# Patient Record
Sex: FEMALE | Race: ASIAN | NOT HISPANIC OR LATINO | URBAN - METROPOLITAN AREA
[De-identification: names, ages, dates, MRNs, and addresses within clinical notes are randomized per-mention and may not be internally consistent; named-entity substitution may affect disease eponyms.]

---

## 2021-10-12 ENCOUNTER — OUTPATIENT (OUTPATIENT)
Dept: OUTPATIENT SERVICES | Facility: HOSPITAL | Age: 26
LOS: 1 days | End: 2021-10-12
Payer: MEDICAID

## 2021-10-12 VITALS
SYSTOLIC BLOOD PRESSURE: 124 MMHG | HEIGHT: 64 IN | HEART RATE: 72 BPM | OXYGEN SATURATION: 100 % | RESPIRATION RATE: 16 BRPM | DIASTOLIC BLOOD PRESSURE: 84 MMHG | TEMPERATURE: 98 F | WEIGHT: 119.93 LBS

## 2021-10-12 DIAGNOSIS — D25.9 LEIOMYOMA OF UTERUS, UNSPECIFIED: ICD-10-CM

## 2021-10-12 DIAGNOSIS — Z01.818 ENCOUNTER FOR OTHER PREPROCEDURAL EXAMINATION: ICD-10-CM

## 2021-10-12 DIAGNOSIS — Z29.9 ENCOUNTER FOR PROPHYLACTIC MEASURES, UNSPECIFIED: ICD-10-CM

## 2021-10-12 DIAGNOSIS — D64.9 ANEMIA, UNSPECIFIED: ICD-10-CM

## 2021-10-12 LAB
ANION GAP SERPL CALC-SCNC: 6 MMOL/L — SIGNIFICANT CHANGE UP (ref 5–17)
APPEARANCE UR: CLEAR — SIGNIFICANT CHANGE UP
APTT BLD: 31.2 SEC — SIGNIFICANT CHANGE UP (ref 27.5–35.5)
BACTERIA # UR AUTO: ABNORMAL /HPF
BILIRUB UR-MCNC: NEGATIVE — SIGNIFICANT CHANGE UP
BLD GP AB SCN SERPL QL: SIGNIFICANT CHANGE UP
BUN SERPL-MCNC: 10 MG/DL — SIGNIFICANT CHANGE UP (ref 7–18)
CALCIUM SERPL-MCNC: 9.3 MG/DL — SIGNIFICANT CHANGE UP (ref 8.4–10.5)
CHLORIDE SERPL-SCNC: 107 MMOL/L — SIGNIFICANT CHANGE UP (ref 96–108)
CO2 SERPL-SCNC: 27 MMOL/L — SIGNIFICANT CHANGE UP (ref 22–31)
COLOR SPEC: YELLOW — SIGNIFICANT CHANGE UP
CREAT SERPL-MCNC: 0.51 MG/DL — SIGNIFICANT CHANGE UP (ref 0.5–1.3)
DIFF PNL FLD: ABNORMAL
EPI CELLS # UR: ABNORMAL /HPF
GLUCOSE SERPL-MCNC: 76 MG/DL — SIGNIFICANT CHANGE UP (ref 70–99)
GLUCOSE UR QL: NEGATIVE — SIGNIFICANT CHANGE UP
HCT VFR BLD CALC: 35.6 % — SIGNIFICANT CHANGE UP (ref 34.5–45)
HGB BLD-MCNC: 11.3 G/DL — LOW (ref 11.5–15.5)
INR BLD: 0.99 RATIO — SIGNIFICANT CHANGE UP (ref 0.88–1.16)
KETONES UR-MCNC: NEGATIVE — SIGNIFICANT CHANGE UP
LEUKOCYTE ESTERASE UR-ACNC: NEGATIVE — SIGNIFICANT CHANGE UP
MCHC RBC-ENTMCNC: 26 PG — LOW (ref 27–34)
MCHC RBC-ENTMCNC: 31.7 GM/DL — LOW (ref 32–36)
MCV RBC AUTO: 82 FL — SIGNIFICANT CHANGE UP (ref 80–100)
NITRITE UR-MCNC: NEGATIVE — SIGNIFICANT CHANGE UP
NRBC # BLD: 0 /100 WBCS — SIGNIFICANT CHANGE UP (ref 0–0)
PH UR: 5 — SIGNIFICANT CHANGE UP (ref 5–8)
PLATELET # BLD AUTO: 333 K/UL — SIGNIFICANT CHANGE UP (ref 150–400)
POTASSIUM SERPL-MCNC: 3.9 MMOL/L — SIGNIFICANT CHANGE UP (ref 3.5–5.3)
POTASSIUM SERPL-SCNC: 3.9 MMOL/L — SIGNIFICANT CHANGE UP (ref 3.5–5.3)
PROT UR-MCNC: NEGATIVE — SIGNIFICANT CHANGE UP
PROTHROM AB SERPL-ACNC: 11.8 SEC — SIGNIFICANT CHANGE UP (ref 10.6–13.6)
RBC # BLD: 4.34 M/UL — SIGNIFICANT CHANGE UP (ref 3.8–5.2)
RBC # FLD: 16.8 % — HIGH (ref 10.3–14.5)
RBC CASTS # UR COMP ASSIST: ABNORMAL /HPF (ref 0–2)
SODIUM SERPL-SCNC: 140 MMOL/L — SIGNIFICANT CHANGE UP (ref 135–145)
SP GR SPEC: 1.01 — SIGNIFICANT CHANGE UP (ref 1.01–1.02)
UROBILINOGEN FLD QL: NEGATIVE — SIGNIFICANT CHANGE UP
WBC # BLD: 3.91 K/UL — SIGNIFICANT CHANGE UP (ref 3.8–10.5)
WBC # FLD AUTO: 3.91 K/UL — SIGNIFICANT CHANGE UP (ref 3.8–10.5)
WBC UR QL: SIGNIFICANT CHANGE UP /HPF (ref 0–5)

## 2021-10-12 PROCEDURE — 93005 ELECTROCARDIOGRAM TRACING: CPT

## 2021-10-12 PROCEDURE — 93010 ELECTROCARDIOGRAM REPORT: CPT

## 2021-10-12 PROCEDURE — G0463: CPT

## 2021-10-12 NOTE — H&P PST ADULT - ATTENDING COMMENTS
plan for ex lap myomectomy and ovarian cystectomy. risks and benefits disc with patient, including bleeding, infection, injury to surrounding organs, possible hysterectomy.  All questions answered.  She wishes to proceed

## 2021-10-12 NOTE — H&P PST ADULT - PROBLEM SELECTOR PLAN 2
Preoperative instructions discussed and given to patient.   Discussed pre-procedural skin preparation using chlorhexidine gluconate 4% solution the morning of surgery prior to coming to hospital.   NPO after midnight.   Instructed to stop aspirin and over the counter medication including vitamins and herbal medications one week prior to surgery unless otherwise instructed by your doctor or anesthesia.   Verbal and written instructions given to patient.   Patient verbalized understanding of instructions and is in agreement with the plan of care.

## 2021-10-12 NOTE — H&P PST ADULT - HISTORY OF PRESENT ILLNESS
july 2021 bleeding 5 hemoglobin , 3 prbc , gyn , fibroids , sx  26 years old female presented to [facility] for evaluation before sx, pt was diagnosed with leiomyoma of uterus, unspecified and is scheduled for exploratory laparoscopic myomectomy on 10/20/2021. Patient had 3 units of PRBC transfused in 07/26/2021 for bleeding in [facility] for hemoglobin 5

## 2021-10-12 NOTE — H&P PST ADULT - NSANTHOSAYNRD_GEN_A_CORE
No. ALTAF screening performed.  STOP BANG Legend: 0-2 = LOW Risk; 3-4 = INTERMEDIATE Risk; 5-8 = HIGH Risk

## 2021-10-12 NOTE — H&P PST ADULT - PROBLEM SELECTOR PLAN 3
As per Dr. Montalvo, Anesthesia , pt needs to have blood ready in case of need of transfusion during surgery. Type and screen sent, second specimen to be sent in am of surgery from [facility].   GYN PA , kindly please to follow up on PRBC ordering for surgery ( 2 units please verify with GYN doctor and Anesthesia). Thank you

## 2021-10-17 ENCOUNTER — APPOINTMENT (OUTPATIENT)
Dept: DISASTER EMERGENCY | Facility: CLINIC | Age: 26
End: 2021-10-17

## 2021-10-17 DIAGNOSIS — Z01.818 ENCOUNTER FOR OTHER PREPROCEDURAL EXAMINATION: ICD-10-CM

## 2021-10-17 PROBLEM — Z00.00 ENCOUNTER FOR PREVENTIVE HEALTH EXAMINATION: Status: ACTIVE | Noted: 2021-10-17

## 2021-10-18 LAB — SARS-COV-2 N GENE NPH QL NAA+PROBE: NOT DETECTED

## 2021-10-19 ENCOUNTER — TRANSCRIPTION ENCOUNTER (OUTPATIENT)
Age: 26
End: 2021-10-19

## 2021-10-20 ENCOUNTER — INPATIENT (INPATIENT)
Facility: HOSPITAL | Age: 26
LOS: 1 days | Discharge: HOME | DRG: 742 | End: 2021-10-22
Attending: OBSTETRICS & GYNECOLOGY | Admitting: OBSTETRICS & GYNECOLOGY
Payer: MEDICAID

## 2021-10-20 ENCOUNTER — RESULT REVIEW (OUTPATIENT)
Age: 26
End: 2021-10-20

## 2021-10-20 VITALS
SYSTOLIC BLOOD PRESSURE: 122 MMHG | TEMPERATURE: 99 F | HEART RATE: 78 BPM | HEIGHT: 64 IN | DIASTOLIC BLOOD PRESSURE: 74 MMHG | OXYGEN SATURATION: 99 % | RESPIRATION RATE: 16 BRPM | WEIGHT: 119.93 LBS

## 2021-10-20 DIAGNOSIS — D25.9 LEIOMYOMA OF UTERUS, UNSPECIFIED: ICD-10-CM

## 2021-10-20 LAB
BLD GP AB SCN SERPL QL: SIGNIFICANT CHANGE UP
HCG UR QL: NEGATIVE — SIGNIFICANT CHANGE UP

## 2021-10-20 PROCEDURE — 88305 TISSUE EXAM BY PATHOLOGIST: CPT | Mod: 26

## 2021-10-20 RX ORDER — OXYCODONE HYDROCHLORIDE 5 MG/1
10 TABLET ORAL EVERY 4 HOURS
Refills: 0 | Status: DISCONTINUED | OUTPATIENT
Start: 2021-10-20 | End: 2021-10-22

## 2021-10-20 RX ORDER — SODIUM CHLORIDE 9 MG/ML
3 INJECTION INTRAMUSCULAR; INTRAVENOUS; SUBCUTANEOUS EVERY 8 HOURS
Refills: 0 | Status: DISCONTINUED | OUTPATIENT
Start: 2021-10-20 | End: 2021-10-20

## 2021-10-20 RX ORDER — HYDROMORPHONE HYDROCHLORIDE 2 MG/ML
0.5 INJECTION INTRAMUSCULAR; INTRAVENOUS; SUBCUTANEOUS
Refills: 0 | Status: DISCONTINUED | OUTPATIENT
Start: 2021-10-20 | End: 2021-10-20

## 2021-10-20 RX ORDER — PANTOPRAZOLE SODIUM 20 MG/1
40 TABLET, DELAYED RELEASE ORAL DAILY
Refills: 0 | Status: DISCONTINUED | OUTPATIENT
Start: 2021-10-20 | End: 2021-10-22

## 2021-10-20 RX ORDER — SENNA PLUS 8.6 MG/1
1 TABLET ORAL AT BEDTIME
Refills: 0 | Status: DISCONTINUED | OUTPATIENT
Start: 2021-10-20 | End: 2021-10-22

## 2021-10-20 RX ORDER — INDOMETHACIN 50 MG
50 CAPSULE ORAL ONCE
Refills: 0 | Status: COMPLETED | OUTPATIENT
Start: 2021-10-20 | End: 2021-10-20

## 2021-10-20 RX ORDER — SIMETHICONE 80 MG/1
80 TABLET, CHEWABLE ORAL EVERY 6 HOURS
Refills: 0 | Status: DISCONTINUED | OUTPATIENT
Start: 2021-10-20 | End: 2021-10-22

## 2021-10-20 RX ORDER — HYDROMORPHONE HYDROCHLORIDE 2 MG/ML
1 INJECTION INTRAMUSCULAR; INTRAVENOUS; SUBCUTANEOUS
Refills: 0 | Status: DISCONTINUED | OUTPATIENT
Start: 2021-10-20 | End: 2021-10-20

## 2021-10-20 RX ORDER — ACETAMINOPHEN 500 MG
1000 TABLET ORAL EVERY 6 HOURS
Refills: 0 | Status: DISCONTINUED | OUTPATIENT
Start: 2021-10-20 | End: 2021-10-22

## 2021-10-20 RX ORDER — KETOROLAC TROMETHAMINE 30 MG/ML
30 SYRINGE (ML) INJECTION EVERY 8 HOURS
Refills: 0 | Status: DISCONTINUED | OUTPATIENT
Start: 2021-10-20 | End: 2021-10-20

## 2021-10-20 RX ORDER — ONDANSETRON 8 MG/1
8 TABLET, FILM COATED ORAL EVERY 8 HOURS
Refills: 0 | Status: COMPLETED | OUTPATIENT
Start: 2021-10-20 | End: 2021-10-21

## 2021-10-20 RX ORDER — INDOMETHACIN 50 MG
25 CAPSULE ORAL EVERY 6 HOURS
Refills: 0 | Status: DISCONTINUED | OUTPATIENT
Start: 2021-10-20 | End: 2021-10-22

## 2021-10-20 RX ORDER — ENOXAPARIN SODIUM 100 MG/ML
40 INJECTION SUBCUTANEOUS EVERY 24 HOURS
Refills: 0 | Status: DISCONTINUED | OUTPATIENT
Start: 2021-10-20 | End: 2021-10-22

## 2021-10-20 RX ADMIN — HYDROMORPHONE HYDROCHLORIDE 1 MILLIGRAM(S): 2 INJECTION INTRAMUSCULAR; INTRAVENOUS; SUBCUTANEOUS at 12:06

## 2021-10-20 RX ADMIN — SODIUM CHLORIDE 3 MILLILITER(S): 9 INJECTION INTRAMUSCULAR; INTRAVENOUS; SUBCUTANEOUS at 06:50

## 2021-10-20 RX ADMIN — Medication 50 MILLIGRAM(S): at 21:49

## 2021-10-20 RX ADMIN — ENOXAPARIN SODIUM 40 MILLIGRAM(S): 100 INJECTION SUBCUTANEOUS at 22:03

## 2021-10-20 RX ADMIN — OXYCODONE HYDROCHLORIDE 10 MILLIGRAM(S): 5 TABLET ORAL at 16:07

## 2021-10-20 RX ADMIN — OXYCODONE HYDROCHLORIDE 10 MILLIGRAM(S): 5 TABLET ORAL at 17:07

## 2021-10-20 RX ADMIN — HYDROMORPHONE HYDROCHLORIDE 1 MILLIGRAM(S): 2 INJECTION INTRAMUSCULAR; INTRAVENOUS; SUBCUTANEOUS at 11:05

## 2021-10-20 RX ADMIN — Medication 50 MILLIGRAM(S): at 22:41

## 2021-10-20 RX ADMIN — Medication 1000 MILLIGRAM(S): at 17:09

## 2021-10-20 NOTE — BRIEF OPERATIVE NOTE - NSICDXBRIEFPREOP_GEN_ALL_CORE_FT
PRE-OP DIAGNOSIS:  Uterine fibroid 20-Oct-2021 10:17:26  Aziza Chi  Paratubal cyst 20-Oct-2021 10:18:04  Aziza Chi

## 2021-10-20 NOTE — BRIEF OPERATIVE NOTE - NSICDXBRIEFPOSTOP_GEN_ALL_CORE_FT
POST-OP DIAGNOSIS:  Uterine fibroid 20-Oct-2021 10:18:42  Aziza Chi  Paratubal cyst 20-Oct-2021 10:18:55  Aziza Chi

## 2021-10-20 NOTE — CHART NOTE - NSCHARTNOTEFT_GEN_A_CORE
GYN PA POST OP NOTE    Pt seen at bedside offers no complaints. Denies n/v, CP; SOB; palpitations; or dizziness    T(C): 36.7 (10-20-21 @ 12:20), Max: 37.3 (10-20-21 @ 06:44)  HR: 72 (10-20-21 @ 12:20) (59 - 78)  BP: 100/62 (10-20-21 @ 12:20) (100/62 - 122/74)  RR: 16 (10-20-21 @ 12:20) (16 - 22)  SpO2: 97% (10-20-21 @ 12:20) (97% - 100%)    abd: soft/nt, incision clean, dry, intact   pelvic: minimal lochia  ext: venodynes in place; no calf pain    A/P: POD #0 s/p abdominal myomectomy, L ovarian cystectomy   cont close monitor   cont post op care  lovenox/venodyne boots for DVT ppx  pain mgmt  d/w Dr. Chi GYN PA POST OP NOTE    Pt seen at bedside offers no complaints. Denies n/v, CP; SOB; palpitations; or dizziness    T(C): 36.7 (10-20-21 @ 12:20), Max: 37.3 (10-20-21 @ 06:44)  HR: 72 (10-20-21 @ 12:20) (59 - 78)  BP: 100/62 (10-20-21 @ 12:20) (100/62 - 122/74)  RR: 16 (10-20-21 @ 12:20) (16 - 22)  SpO2: 97% (10-20-21 @ 12:20) (97% - 100%)    abd: soft/nt, incision clean, dry, intact   pelvic: pad saturated, golf ball size clot expressed, pad changed, no active bleeding    ext: venodynes in place; no calf pain    A/P: POD #0 s/p abdominal myomectomy, L ovarian cystectomy   cont close monitor   cont post op care  lovenox/venodyne boots for DVT ppx  pain mgmt  d/w Dr. Chi

## 2021-10-20 NOTE — BRIEF OPERATIVE NOTE - NSICDXBRIEFPROCEDURE_GEN_ALL_CORE_FT
PROCEDURES:  Abdominal myomectomy 20-Oct-2021 10:15:20  Aziza Chi  Removal of ovarian cyst 20-Oct-2021 10:17:15  Aziza Chi

## 2021-10-21 LAB
ANION GAP SERPL CALC-SCNC: 4 MMOL/L — LOW (ref 5–17)
BASOPHILS # BLD AUTO: 0.01 K/UL — SIGNIFICANT CHANGE UP (ref 0–0.2)
BASOPHILS NFR BLD AUTO: 0.2 % — SIGNIFICANT CHANGE UP (ref 0–2)
BUN SERPL-MCNC: 8 MG/DL — SIGNIFICANT CHANGE UP (ref 7–18)
CALCIUM SERPL-MCNC: 8.2 MG/DL — LOW (ref 8.4–10.5)
CHLORIDE SERPL-SCNC: 108 MMOL/L — SIGNIFICANT CHANGE UP (ref 96–108)
CO2 SERPL-SCNC: 28 MMOL/L — SIGNIFICANT CHANGE UP (ref 22–31)
COVID-19 SPIKE DOMAIN AB INTERP: POSITIVE
COVID-19 SPIKE DOMAIN ANTIBODY RESULT: >250 U/ML — HIGH
CREAT SERPL-MCNC: 0.5 MG/DL — SIGNIFICANT CHANGE UP (ref 0.5–1.3)
EOSINOPHIL # BLD AUTO: 0.12 K/UL — SIGNIFICANT CHANGE UP (ref 0–0.5)
EOSINOPHIL NFR BLD AUTO: 2 % — SIGNIFICANT CHANGE UP (ref 0–6)
GLUCOSE SERPL-MCNC: 121 MG/DL — HIGH (ref 70–99)
HCT VFR BLD CALC: 24.8 % — LOW (ref 34.5–45)
HGB BLD-MCNC: 8 G/DL — LOW (ref 11.5–15.5)
IMM GRANULOCYTES NFR BLD AUTO: 0.3 % — SIGNIFICANT CHANGE UP (ref 0–1.5)
LYMPHOCYTES # BLD AUTO: 1.46 K/UL — SIGNIFICANT CHANGE UP (ref 1–3.3)
LYMPHOCYTES # BLD AUTO: 24.7 % — SIGNIFICANT CHANGE UP (ref 13–44)
MCHC RBC-ENTMCNC: 26.3 PG — LOW (ref 27–34)
MCHC RBC-ENTMCNC: 32.3 GM/DL — SIGNIFICANT CHANGE UP (ref 32–36)
MCV RBC AUTO: 81.6 FL — SIGNIFICANT CHANGE UP (ref 80–100)
MONOCYTES # BLD AUTO: 0.73 K/UL — SIGNIFICANT CHANGE UP (ref 0–0.9)
MONOCYTES NFR BLD AUTO: 12.4 % — SIGNIFICANT CHANGE UP (ref 2–14)
NEUTROPHILS # BLD AUTO: 3.57 K/UL — SIGNIFICANT CHANGE UP (ref 1.8–7.4)
NEUTROPHILS NFR BLD AUTO: 60.4 % — SIGNIFICANT CHANGE UP (ref 43–77)
NRBC # BLD: 0 /100 WBCS — SIGNIFICANT CHANGE UP (ref 0–0)
PLATELET # BLD AUTO: 242 K/UL — SIGNIFICANT CHANGE UP (ref 150–400)
POTASSIUM SERPL-MCNC: 4.2 MMOL/L — SIGNIFICANT CHANGE UP (ref 3.5–5.3)
POTASSIUM SERPL-SCNC: 4.2 MMOL/L — SIGNIFICANT CHANGE UP (ref 3.5–5.3)
RBC # BLD: 3.04 M/UL — LOW (ref 3.8–5.2)
RBC # FLD: 15.8 % — HIGH (ref 10.3–14.5)
SARS-COV-2 IGG+IGM SERPL QL IA: >250 U/ML — HIGH
SARS-COV-2 IGG+IGM SERPL QL IA: POSITIVE
SODIUM SERPL-SCNC: 140 MMOL/L — SIGNIFICANT CHANGE UP (ref 135–145)
WBC # BLD: 5.91 K/UL — SIGNIFICANT CHANGE UP (ref 3.8–10.5)
WBC # FLD AUTO: 5.91 K/UL — SIGNIFICANT CHANGE UP (ref 3.8–10.5)

## 2021-10-21 RX ORDER — PANTOPRAZOLE SODIUM 20 MG/1
1 TABLET, DELAYED RELEASE ORAL
Qty: 30 | Refills: 0
Start: 2021-10-21 | End: 2021-11-19

## 2021-10-21 RX ORDER — FERROUS SULFATE 325(65) MG
1 TABLET ORAL
Qty: 90 | Refills: 0
Start: 2021-10-21 | End: 2021-11-19

## 2021-10-21 RX ORDER — ACETAMINOPHEN 500 MG
1000 TABLET ORAL ONCE
Refills: 0 | Status: COMPLETED | OUTPATIENT
Start: 2021-10-21 | End: 2021-10-21

## 2021-10-21 RX ORDER — ASCORBIC ACID 60 MG
500 TABLET,CHEWABLE ORAL DAILY
Refills: 0 | Status: DISCONTINUED | OUTPATIENT
Start: 2021-10-21 | End: 2021-10-22

## 2021-10-21 RX ORDER — FERROUS SULFATE 325(65) MG
325 TABLET ORAL
Refills: 0 | Status: DISCONTINUED | OUTPATIENT
Start: 2021-10-21 | End: 2021-10-22

## 2021-10-21 RX ORDER — IBUPROFEN 200 MG
1 TABLET ORAL
Qty: 20 | Refills: 0
Start: 2021-10-21 | End: 2021-10-25

## 2021-10-21 RX ORDER — SENNOSIDES/DOCUSATE SODIUM 8.6MG-50MG
2 TABLET ORAL
Qty: 60 | Refills: 0
Start: 2021-10-21 | End: 2021-11-19

## 2021-10-21 RX ORDER — METOCLOPRAMIDE HCL 10 MG
10 TABLET ORAL ONCE
Refills: 0 | Status: COMPLETED | OUTPATIENT
Start: 2021-10-21 | End: 2021-10-21

## 2021-10-21 RX ORDER — ASCORBIC ACID 60 MG
1 TABLET,CHEWABLE ORAL
Qty: 0 | Refills: 0 | DISCHARGE
Start: 2021-10-21

## 2021-10-21 RX ORDER — ACETAMINOPHEN 500 MG
2 TABLET ORAL
Qty: 40 | Refills: 1
Start: 2021-10-21 | End: 2021-10-30

## 2021-10-21 RX ORDER — SIMETHICONE 80 MG/1
1 TABLET, CHEWABLE ORAL
Qty: 30 | Refills: 0
Start: 2021-10-21 | End: 2021-10-25

## 2021-10-21 RX ORDER — NORETHINDRONE AND ETHINYL ESTRADIOL 0.4-0.035
1 KIT ORAL
Qty: 0 | Refills: 0 | DISCHARGE

## 2021-10-21 RX ORDER — ONDANSETRON 8 MG/1
4 TABLET, FILM COATED ORAL ONCE
Refills: 0 | Status: DISCONTINUED | OUTPATIENT
Start: 2021-10-21 | End: 2021-10-22

## 2021-10-21 RX ADMIN — Medication 325 MILLIGRAM(S): at 18:24

## 2021-10-21 RX ADMIN — Medication 1000 MILLIGRAM(S): at 06:28

## 2021-10-21 RX ADMIN — Medication 1000 MILLIGRAM(S): at 23:40

## 2021-10-21 RX ADMIN — Medication 1000 MILLIGRAM(S): at 00:53

## 2021-10-21 RX ADMIN — Medication 1000 MILLIGRAM(S): at 18:57

## 2021-10-21 RX ADMIN — Medication 25 MILLIGRAM(S): at 12:33

## 2021-10-21 RX ADMIN — Medication 25 MILLIGRAM(S): at 10:51

## 2021-10-21 RX ADMIN — Medication 25 MILLIGRAM(S): at 16:58

## 2021-10-21 RX ADMIN — Medication 500 MILLIGRAM(S): at 11:04

## 2021-10-21 RX ADMIN — Medication 25 MILLIGRAM(S): at 02:25

## 2021-10-21 RX ADMIN — Medication 1000 MILLIGRAM(S): at 11:05

## 2021-10-21 RX ADMIN — Medication 400 MILLIGRAM(S): at 10:51

## 2021-10-21 RX ADMIN — ONDANSETRON 8 MILLIGRAM(S): 8 TABLET, FILM COATED ORAL at 10:57

## 2021-10-21 RX ADMIN — PANTOPRAZOLE SODIUM 40 MILLIGRAM(S): 20 TABLET, DELAYED RELEASE ORAL at 11:04

## 2021-10-21 RX ADMIN — ENOXAPARIN SODIUM 40 MILLIGRAM(S): 100 INJECTION SUBCUTANEOUS at 21:38

## 2021-10-21 RX ADMIN — Medication 1000 MILLIGRAM(S): at 00:10

## 2021-10-21 RX ADMIN — Medication 10 MILLIGRAM(S): at 14:46

## 2021-10-21 RX ADMIN — Medication 25 MILLIGRAM(S): at 16:27

## 2021-10-21 RX ADMIN — Medication 25 MILLIGRAM(S): at 05:53

## 2021-10-21 NOTE — PROGRESS NOTE ADULT - ASSESSMENT
pod#1 s/p abd myomectomy  -pain management per dr nagy; iv tylenol  cont indocin for post myomectomy   vs routine  anti emetic ordered  oob  incentive spirometer   iron/vitamin c for anemia

## 2021-10-22 ENCOUNTER — TRANSCRIPTION ENCOUNTER (OUTPATIENT)
Age: 26
End: 2021-10-22

## 2021-10-22 VITALS
RESPIRATION RATE: 18 BRPM | HEART RATE: 84 BPM | TEMPERATURE: 98 F | OXYGEN SATURATION: 99 % | SYSTOLIC BLOOD PRESSURE: 111 MMHG | DIASTOLIC BLOOD PRESSURE: 56 MMHG

## 2021-10-22 PROCEDURE — 36415 COLL VENOUS BLD VENIPUNCTURE: CPT

## 2021-10-22 PROCEDURE — C1765: CPT

## 2021-10-22 PROCEDURE — 86769 SARS-COV-2 COVID-19 ANTIBODY: CPT

## 2021-10-22 PROCEDURE — 80048 BASIC METABOLIC PNL TOTAL CA: CPT

## 2021-10-22 PROCEDURE — 86850 RBC ANTIBODY SCREEN: CPT

## 2021-10-22 PROCEDURE — 81025 URINE PREGNANCY TEST: CPT

## 2021-10-22 PROCEDURE — 86900 BLOOD TYPING SEROLOGIC ABO: CPT

## 2021-10-22 PROCEDURE — 88305 TISSUE EXAM BY PATHOLOGIST: CPT

## 2021-10-22 PROCEDURE — C1889: CPT

## 2021-10-22 PROCEDURE — 85025 COMPLETE CBC W/AUTO DIFF WBC: CPT

## 2021-10-22 PROCEDURE — 86901 BLOOD TYPING SEROLOGIC RH(D): CPT

## 2021-10-22 RX ADMIN — SIMETHICONE 80 MILLIGRAM(S): 80 TABLET, CHEWABLE ORAL at 10:50

## 2021-10-22 RX ADMIN — PANTOPRAZOLE SODIUM 40 MILLIGRAM(S): 20 TABLET, DELAYED RELEASE ORAL at 10:46

## 2021-10-22 RX ADMIN — Medication 1000 MILLIGRAM(S): at 03:03

## 2021-10-22 RX ADMIN — SENNA PLUS 1 TABLET(S): 8.6 TABLET ORAL at 10:50

## 2021-10-22 RX ADMIN — Medication 1000 MILLIGRAM(S): at 06:20

## 2021-10-22 RX ADMIN — Medication 1000 MILLIGRAM(S): at 05:08

## 2021-10-22 RX ADMIN — Medication 325 MILLIGRAM(S): at 05:07

## 2021-10-22 RX ADMIN — Medication 500 MILLIGRAM(S): at 10:46

## 2021-10-22 NOTE — PROGRESS NOTE ADULT - ASSESSMENT
post op day 2  abd myomectomy  acute blood loss anemia vss asymptomatic erx iron/vitamin c  -dc home today per dr nagy  wound check/post op check in 2weeks  -dc instructions verbalized   -pt verbalizes understanding

## 2021-10-22 NOTE — DISCHARGE NOTE PROVIDER - CARE PROVIDER_API CALL
Aziza Chi)  Obstetrics and Gynecology  [address] [place] [id number]  Phone: [phone]  Fax: [phone]  Established Patient  Follow Up Time: 2 weeks

## 2021-10-22 NOTE — PROGRESS NOTE ADULT - SUBJECTIVE AND OBJECTIVE BOX
pod#1 s/p abd myomectomy  pt doing well  afebrile  c/o incisional pain  c/o mild nausea this morning  denies vomiting  due to void calhoun removed almost 8am today  has not ambulated yet    Vital Signs Last 24 Hrs  T(C): 36.8 (21 Oct 2021 06:08), Max: 37.3 (20 Oct 2021 21:06)  T(F): 98.2 (21 Oct 2021 06:08), Max: 99.1 (20 Oct 2021 21:06)  HR: 76 (21 Oct 2021 06:08) (65 - 96)  BP: 93/57 (21 Oct 2021 06:08) (93/57 - 118/69)  BP(mean): 71 (20 Oct 2021 12:00) (68 - 78)  RR: 16 (21 Oct 2021 06:08) (16 - 22)  SpO2: 99% (21 Oct 2021 06:08) (97% - 100%)    lungs b/l cta  heart rrr  abd +BS soft ND no guarding no rebound  inc site well healing no erythema/edema  pad: no active vag bleeding  extrem no edema b/l                          8.0    5.91  )-----------( 242      ( 21 Oct 2021 06:31 )             24.8   10-21    140  |  108  |  8   ----------------------------<  121<H>  4.2   |  28  |  0.50    Ca    8.2<L>      21 Oct 2021 06:31    
post op day 2  abd myomectomy  pt doing well  +flatus  void w/o difficulty  denies cp/sob/ palpitations  pt is feeling better today post operative than yesterday   offers no acute complaints    Vital Signs Last 24 Hrs  T(C): 36.3 (22 Oct 2021 05:35), Max: 36.8 (21 Oct 2021 12:30)  T(F): 97.4 (22 Oct 2021 05:35), Max: 98.3 (21 Oct 2021 12:30)  HR: 82 (22 Oct 2021 05:35) (82 - 86)  BP: 112/62 (22 Oct 2021 05:35) (91/65 - 112/62)  BP(mean): --  RR: 18 (22 Oct 2021 05:35) (16 - 18)  SpO2: 100% (22 Oct 2021 05:35) (99% - 100%)    lungs b/l cta  heart rrr  abd +Bs soft ND no guarding no rebound  inc site c/d/intact no erythema/edema  no active vag bleeding    no new labs

## 2021-10-22 NOTE — DISCHARGE NOTE PROVIDER - NSDCCPCAREPLAN_GEN_ALL_CORE_FT
PRINCIPAL DISCHARGE DIAGNOSIS  Diagnosis: Leiomyoma of uterus, unspecified  Assessment and Plan of Treatment: myomectomy done      SECONDARY DISCHARGE DIAGNOSES  Diagnosis: Anemia  Assessment and Plan of Treatment: iron/vitamin c supplement     PRINCIPAL DISCHARGE DIAGNOSIS  Diagnosis: Leiomyoma of uterus, unspecified  Assessment and Plan of Treatment: myomectomy done      SECONDARY DISCHARGE DIAGNOSES  Diagnosis: Anemia  Assessment and Plan of Treatment: iron/vitamin c supplement; green leafy vegetable for iron

## 2021-10-22 NOTE — DISCHARGE NOTE NURSING/CASE MANAGEMENT/SOCIAL WORK - PATIENT PORTAL LINK FT
You can access the FollowMyHealth Patient Portal offered by [facility] by registering at the following website: [email or web address]. By joining [facility]’s FollowMyHealth portal, you will also be able to view your health information using other applications (apps) compatible with our system.

## 2021-10-22 NOTE — DISCHARGE NOTE PROVIDER - EXTENDED VTE YES NO FOR MLM ENOXAPARIN
The patient sent a request through My [facility] asking for DM/Microalbumin and Shingles.  Please call the patient when this can be done   ,

## 2021-10-22 NOTE — DISCHARGE NOTE PROVIDER - NSDCCPTREATMENT_GEN_ALL_CORE_FT
PRINCIPAL PROCEDURE  Procedure: Abdominal myomectomy  Findings and Treatment: follow up in 2weeks post op

## 2021-10-22 NOTE — DISCHARGE NOTE PROVIDER - NSDCMRMEDTOKEN_GEN_ALL_CORE_FT
acetaminophen 500 mg oral capsule: 2 cap(s) orally every 6 hours, As Needed   ascorbic acid 500 mg oral tablet: 1 tab(s) orally once a day  ferrous sulfate 325 mg (65 mg elemental iron) oral tablet: 1 tab(s) orally 3 times a day   ibuprofen 600 mg oral tablet: 1 tab(s) orally every 6 hours, As needed, Mild pain or headache  pantoprazole 40 mg oral delayed release tablet: 1 tab(s) orally once a day  Betsy-Colace 50 mg-8.6 mg oral tablet: 2 tab(s) orally once a day (at bedtime)   simethicone 80 mg oral tablet, chewable: 1 tab(s) orally every 4 hours, As needed, Gas

## 2021-10-25 LAB — SURGICAL PATHOLOGY STUDY: SIGNIFICANT CHANGE UP

## 2022-01-03 PROBLEM — D64.9 ANEMIA, UNSPECIFIED: Chronic | Status: ACTIVE | Noted: 2021-10-12

## 2022-01-13 ENCOUNTER — APPOINTMENT (OUTPATIENT)
Dept: GASTROENTEROLOGY | Facility: CLINIC | Age: 27
End: 2022-01-13
Payer: MEDICAID

## 2022-01-13 ENCOUNTER — APPOINTMENT (OUTPATIENT)
Dept: SURGERY | Facility: CLINIC | Age: 27
End: 2022-01-13
Payer: MEDICAID

## 2022-01-13 VITALS — TEMPERATURE: 97 F

## 2022-01-13 VITALS
TEMPERATURE: 97.7 F | HEART RATE: 75 BPM | OXYGEN SATURATION: 97 % | DIASTOLIC BLOOD PRESSURE: 71 MMHG | BODY MASS INDEX: 19.29 KG/M2 | HEIGHT: 64 IN | SYSTOLIC BLOOD PRESSURE: 113 MMHG | WEIGHT: 113 LBS

## 2022-01-13 VITALS
HEIGHT: 64 IN | HEART RATE: 86 BPM | OXYGEN SATURATION: 98 % | SYSTOLIC BLOOD PRESSURE: 126 MMHG | WEIGHT: 113 LBS | BODY MASS INDEX: 19.29 KG/M2 | DIASTOLIC BLOOD PRESSURE: 75 MMHG

## 2022-01-13 DIAGNOSIS — R74.01 ELEVATION OF LEVELS OF LIVER TRANSAMINASE LEVELS: ICD-10-CM

## 2022-01-13 DIAGNOSIS — Z78.9 OTHER SPECIFIED HEALTH STATUS: ICD-10-CM

## 2022-01-13 DIAGNOSIS — K80.20 CALCULUS OF GALLBLADDER W/OUT CHOLECYSTITIS W/OUT OBSTRUCTION: ICD-10-CM

## 2022-01-13 PROCEDURE — 99204 OFFICE O/P NEW MOD 45 MIN: CPT

## 2022-01-13 PROCEDURE — 99203 OFFICE O/P NEW LOW 30 MIN: CPT

## 2022-01-13 NOTE — HISTORY OF PRESENT ILLNESS
[de-identified] : This is a 26 year old female here for evaluation of intermittent RUQ pain for 1 month. PMH of myomectomy. No family history of GI issues or cancers. Recent hospitalization showed elevated LFTs. CT from 12/15/21 showed gallbladder wall edema and sludge. US from 12/13/21 showed cholestasis, gallbladder wall thickening of 5mm. She had lost 10 pounds due to diet modification.  She was on birth control pills but stopped due to RUQ pain. Denies N&V, blood in stool, chills or fever.

## 2022-01-13 NOTE — PLAN
[FreeTextEntry1] : Ms. SMITH  was told significance of findings, options, risks and benefits were explained.  Informed consent for laparoscopic/possible open  cholecystectomy  and potential risks, benefits and alternatives (surgical options were discussed including non-surgical options or the option of no surgery) to the planned surgery were discussed in depth.  All surgical options were discussed including non-surgical treatments.  She wishes to proceed with surgery.  We will plan for surgery on at the next available date, pending any required insurance pre-certification or pre-approval. She agrees to obtain any necessary pre-operative evaluations and testing prior to surgery. Patient instructed to maintain a fat-free diet, and to seek immediate medical attention with any acute change or worsening of symptoms, including but not limited to abdominal pain, fever, chills, nausea, vomiting, or yellowing of the skin. \par Patient advised to seek immediate medical attention with any acute change in symptoms or with the development of any new or worsening symptoms.  Patient's questions and concerns addressed to patient's satisfaction, and patient verbalized an understanding of the information discussed.

## 2022-01-13 NOTE — ASSESSMENT
[FreeTextEntry1] : This is a 26 year old female here for evaluation of intermittent RUQ pain for 1 month. PMH of myomectomy. No family history of GI issues or cancers. Recent hospitalization showed elevated LFTs. CT from 12/15/21 showed gallbladder wall edema and sludge. US from 12/13/21 showed cholestasis, gallbladder wall thickening of 5mm. She had lost 10 pounds due to diet modification.  She was on birth control pills but stopped due to RUQ pain. Denies N&V, blood in stool, chills or fever. \par \par The gallbladder wall of 5mm is over the limits. Although, she is asymptomatic i wound refer her to s surgeon for cholecystectomy under a controlled environment. Blood work to make sure LFT elevation is not due to hepatitis. \par \par My plan\par -hepatitis panel \par -surgery referral

## 2022-01-13 NOTE — HISTORY OF PRESENT ILLNESS
[de-identified] : Ms. SOHYEON LEE is a 26 year  old patient who was referred by Dr. Dale Morales with the chief complaint of having right upper quadrant and epigastric pain for 1.5 months. Pain is radiating to the back. She reports no nausea or vomiting and no history of jaundice, acholia or choluria.   Appetite is good and weight is stable.   She   has no family history of biliary tract disease.  She had an abdominal sonogram on  12/13/2021 which revealed GB stones, GB wall thickening and sludge. CBD is normal.   patient reports  visit to  [facility] ED  on 12/14/2021 for severe abdominal pain.

## 2022-01-13 NOTE — PHYSICAL EXAM
[General Appearance - Alert] : alert [General Appearance - In No Acute Distress] : in no acute distress [Full Pulse] : the pedal pulses are present [Edema] : there was no peripheral edema [Bowel Sounds] : normal bowel sounds [Abdomen Soft] : soft [Abdomen Tenderness] : non-tender [Abdomen Mass (___ Cm)] : no abdominal mass palpated [No CVA Tenderness] : no ~M costovertebral angle tenderness [No Spinal Tenderness] : no spinal tenderness [Abnormal Walk] : normal gait [Nail Clubbing] : no clubbing  or cyanosis of the fingernails [Musculoskeletal - Swelling] : no joint swelling seen [Motor Tone] : muscle strength and tone were normal [Skin Color & Pigmentation] : normal skin color and pigmentation [Skin Turgor] : normal skin turgor [] : no rash [Deep Tendon Reflexes (DTR)] : deep tendon reflexes were 2+ and symmetric [Sensation] : the sensory exam was normal to light touch and pinprick [No Focal Deficits] : no focal deficits [Oriented To Time, Place, And Person] : oriented to person, place, and time [Impaired Insight] : insight and judgment were intact [Affect] : the affect was normal

## 2022-01-13 NOTE — CONSULT LETTER
[Dear  ___] : Dear  [unfilled], [Consult Letter:] : I had the pleasure of evaluating your patient, [unfilled]. [Please see my note below.] : Please see my note below. [Consult Closing:] : Thank you very much for allowing me to participate in the care of this patient.  If you have any questions, please do not hesitate to contact me. [Sincerely,] : Sincerely, [FreeTextEntry3] : Neo Heredia MD, FACS

## 2022-01-13 NOTE — PHYSICAL EXAM
[Abdominal Masses] : No abdominal masses [Abdomen Tenderness] : ~T ~M No abdominal tenderness [Alert] : alert [Oriented to Person] : oriented to person [Oriented to Place] : oriented to place [Oriented to Time] : oriented to time [Calm] : calm [de-identified] : She  is alert, well-groomed, and in NAD\par   [de-identified] : anicteric.  Nasal mucosa pink, septum midline. Oral mucosa pink.  Tongue midline, Pharynx without exudates.\par   [de-identified] : Neck supple. Trachea midline. Thyroid isthmus barely palpable, lobes not felt.\par   [de-identified] : .

## 2022-01-16 LAB
HAV IGM SER QL: NONREACTIVE
HBV CORE IGG+IGM SER QL: NONREACTIVE
HBV CORE IGM SER QL: NONREACTIVE
HBV DNA # SERPL NAA+PROBE: NOT DETECTED IU/ML
HBV SURFACE AB SER QL: REACTIVE
HBV SURFACE AB SERPL IA-ACNC: 65.8 MIU/ML
HBV SURFACE AG SER QL: NONREACTIVE
HCV AB SER QL: NONREACTIVE
HCV RNA SERPL NAA+PROBE-LOG IU: NOT DETECTED LOGIU/ML
HCV S/CO RATIO: 0.2 S/CO
HEPB DNA PCR LOG: NOT DETECTED LOG10IU/ML
HEPC RNA INTERP: NOT DETECTED

## 2022-01-20 LAB
HCV RNA FLD QL NAA+PROBE: NEGATIVE
HEV AB SER QL: NEGATIVE

## 2022-01-31 ENCOUNTER — OUTPATIENT (OUTPATIENT)
Dept: OUTPATIENT SERVICES | Facility: HOSPITAL | Age: 27
LOS: 1 days | End: 2022-01-31
Payer: MEDICAID

## 2022-01-31 VITALS
SYSTOLIC BLOOD PRESSURE: 106 MMHG | DIASTOLIC BLOOD PRESSURE: 70 MMHG | HEART RATE: 79 BPM | RESPIRATION RATE: 18 BRPM | OXYGEN SATURATION: 98 % | HEIGHT: 64 IN | TEMPERATURE: 98 F | WEIGHT: 111.99 LBS

## 2022-01-31 DIAGNOSIS — K21.9 GASTRO-ESOPHAGEAL REFLUX DISEASE WITHOUT ESOPHAGITIS: ICD-10-CM

## 2022-01-31 DIAGNOSIS — K81.9 CHOLECYSTITIS, UNSPECIFIED: ICD-10-CM

## 2022-01-31 DIAGNOSIS — Z01.818 ENCOUNTER FOR OTHER PREPROCEDURAL EXAMINATION: ICD-10-CM

## 2022-01-31 DIAGNOSIS — Z91.89 OTHER SPECIFIED PERSONAL RISK FACTORS, NOT ELSEWHERE CLASSIFIED: ICD-10-CM

## 2022-01-31 DIAGNOSIS — Z98.890 OTHER SPECIFIED POSTPROCEDURAL STATES: Chronic | ICD-10-CM

## 2022-01-31 DIAGNOSIS — K80.20 CALCULUS OF GALLBLADDER WITHOUT CHOLECYSTITIS WITHOUT OBSTRUCTION: ICD-10-CM

## 2022-01-31 LAB
ALBUMIN SERPL ELPH-MCNC: 3.6 G/DL — SIGNIFICANT CHANGE UP (ref 3.5–5)
ALP SERPL-CCNC: 45 U/L — SIGNIFICANT CHANGE UP (ref 40–120)
ALT FLD-CCNC: 12 U/L DA — SIGNIFICANT CHANGE UP (ref 10–60)
ANION GAP SERPL CALC-SCNC: 5 MMOL/L — SIGNIFICANT CHANGE UP (ref 5–17)
APTT BLD: 37.2 SEC — HIGH (ref 27.5–35.5)
AST SERPL-CCNC: 8 U/L — LOW (ref 10–40)
BILIRUB SERPL-MCNC: 0.3 MG/DL — SIGNIFICANT CHANGE UP (ref 0.2–1.2)
BLD GP AB SCN SERPL QL: SIGNIFICANT CHANGE UP
BUN SERPL-MCNC: 7 MG/DL — SIGNIFICANT CHANGE UP (ref 7–18)
CALCIUM SERPL-MCNC: 9.2 MG/DL — SIGNIFICANT CHANGE UP (ref 8.4–10.5)
CHLORIDE SERPL-SCNC: 106 MMOL/L — SIGNIFICANT CHANGE UP (ref 96–108)
CO2 SERPL-SCNC: 28 MMOL/L — SIGNIFICANT CHANGE UP (ref 22–31)
CREAT SERPL-MCNC: 0.43 MG/DL — LOW (ref 0.5–1.3)
GLUCOSE SERPL-MCNC: 93 MG/DL — SIGNIFICANT CHANGE UP (ref 70–99)
HCT VFR BLD CALC: 31.2 % — LOW (ref 34.5–45)
HGB BLD-MCNC: 9.8 G/DL — LOW (ref 11.5–15.5)
INR BLD: 1.04 RATIO — SIGNIFICANT CHANGE UP (ref 0.88–1.16)
MCHC RBC-ENTMCNC: 24.3 PG — LOW (ref 27–34)
MCHC RBC-ENTMCNC: 31.4 GM/DL — LOW (ref 32–36)
MCV RBC AUTO: 77.2 FL — LOW (ref 80–100)
NRBC # BLD: 0 /100 WBCS — SIGNIFICANT CHANGE UP (ref 0–0)
PLATELET # BLD AUTO: 298 K/UL — SIGNIFICANT CHANGE UP (ref 150–400)
POTASSIUM SERPL-MCNC: 4 MMOL/L — SIGNIFICANT CHANGE UP (ref 3.5–5.3)
POTASSIUM SERPL-SCNC: 4 MMOL/L — SIGNIFICANT CHANGE UP (ref 3.5–5.3)
PROT SERPL-MCNC: 7.4 G/DL — SIGNIFICANT CHANGE UP (ref 6–8.3)
PROTHROM AB SERPL-ACNC: 12.4 SEC — SIGNIFICANT CHANGE UP (ref 10.6–13.6)
RBC # BLD: 4.04 M/UL — SIGNIFICANT CHANGE UP (ref 3.8–5.2)
RBC # FLD: 16.9 % — HIGH (ref 10.3–14.5)
SODIUM SERPL-SCNC: 139 MMOL/L — SIGNIFICANT CHANGE UP (ref 135–145)
WBC # BLD: 5.61 K/UL — SIGNIFICANT CHANGE UP (ref 3.8–10.5)
WBC # FLD AUTO: 5.61 K/UL — SIGNIFICANT CHANGE UP (ref 3.8–10.5)

## 2022-01-31 PROCEDURE — G0463: CPT

## 2022-01-31 NOTE — H&P PST ADULT - PROBLEM SELECTOR PLAN 3
ALTAF stop bang score 1. Pt never had sleep study, is at low risk for sleep apnea and is at low risk for pulmonary complications. Perioperative pulmonary precautions to be maintained.

## 2022-01-31 NOTE — H&P PST ADULT - HISTORY OF PRESENT ILLNESS
This is a 26 yr old female with PMH of GERD who presents with c/o intermittent abdominal pain due to gallstones. Pt reports worsening of pain after ingestion of fatty meals. Pt is scheduled for laparoscopic cholecystectomy with intraoperative cholangiogram, possible open on 02/04/2022.

## 2022-01-31 NOTE — H&P PST ADULT - PROBLEM SELECTOR PLAN 1
Laparoscopic cholecystectomy with intraoperative cholangiogram, possible open on 02/04/2022.  Preoperative instructions discussed with pt and given to pt. Instructed pt that she will need someone to escort her home after surgery, to notify security when she arrives in the lobby of the hospital that she is here for surgery, not to eat or drink anything after midnight the night before the surgery, to avoid NSAIDs such as Ibuprofen, Motrin, Aleve, Advil, Naproxen before surgery, to take Tylenol if needed for pain, to report if she has been exposed to any one with any contagious diseases including Covid-19 or if she is exhibiting any symptoms of COVID-19, to keep appointment for COVID-19  test 3 days before surgery. Instructed about use of Chlorhexidine 4% soap before surgery. Verbalized understanding of instructions given.

## 2022-01-31 NOTE — H&P PST ADULT - NSICDXPASTSURGICALHX_GEN_ALL_CORE_FT
PAST SURGICAL HISTORY:  History of exploratory laparotomy abdominal myomectomy, left paratubal cystectomy on 10/20/2021

## 2022-01-31 NOTE — H&P PST ADULT - ASSESSMENT
This is a 26 yr old female with PMH of GERD who presents with cholelithiasis. Pt is scheduled for laparoscopic cholecystectomy with intraoperative cholangiogram, possible open on 02/04/2022.  ALTAF stop bang score 1. Pt is at low risk for sleep apnea and at low risk for pulmonary complications.

## 2022-01-31 NOTE — H&P PST ADULT - NSICDXFAMILYHX_GEN_ALL_CORE_FT
FAMILY HISTORY:  Mother  Still living? Yes, Estimated age: Age Unknown  Family history of hypertension, Age at diagnosis: Age Unknown    Grandparent  Still living? Yes, Estimated age: Age Unknown  Family history of diabetes mellitus, Age at diagnosis: Age Unknown

## 2022-01-31 NOTE — H&P PST ADULT - PROBLEM SELECTOR PLAN 2
Instructed to continue Omeprazole and to take it with sips of water on day of surgery. Follow-up with provider postop for management.

## 2022-01-31 NOTE — H&P PST ADULT - NEGATIVE NEUROLOGICAL SYMPTOMS
no weakness/no paresthesias/no generalized seizures/no focal seizures/no syncope/no tremors/no vertigo/no loss of sensation/no difficulty walking

## 2022-02-03 ENCOUNTER — TRANSCRIPTION ENCOUNTER (OUTPATIENT)
Age: 27
End: 2022-02-03

## 2022-02-04 ENCOUNTER — RESULT REVIEW (OUTPATIENT)
Age: 27
End: 2022-02-04

## 2022-02-04 ENCOUNTER — OUTPATIENT (OUTPATIENT)
Dept: OUTPATIENT SERVICES | Facility: HOSPITAL | Age: 27
LOS: 1 days | End: 2022-02-04
Payer: MEDICAID

## 2022-02-04 ENCOUNTER — APPOINTMENT (OUTPATIENT)
Dept: SURGERY | Facility: HOSPITAL | Age: 27
End: 2022-02-04
Payer: MEDICAID

## 2022-02-04 VITALS
OXYGEN SATURATION: 99 % | HEART RATE: 71 BPM | TEMPERATURE: 98 F | DIASTOLIC BLOOD PRESSURE: 53 MMHG | SYSTOLIC BLOOD PRESSURE: 110 MMHG | RESPIRATION RATE: 17 BRPM

## 2022-02-04 VITALS
HEART RATE: 72 BPM | DIASTOLIC BLOOD PRESSURE: 69 MMHG | HEIGHT: 64 IN | RESPIRATION RATE: 18 BRPM | OXYGEN SATURATION: 99 % | WEIGHT: 111.99 LBS | SYSTOLIC BLOOD PRESSURE: 115 MMHG | TEMPERATURE: 98 F

## 2022-02-04 DIAGNOSIS — K81.9 CHOLECYSTITIS, UNSPECIFIED: ICD-10-CM

## 2022-02-04 DIAGNOSIS — Z98.890 OTHER SPECIFIED POSTPROCEDURAL STATES: Chronic | ICD-10-CM

## 2022-02-04 LAB
BLD GP AB SCN SERPL QL: SIGNIFICANT CHANGE UP
HCG UR QL: NEGATIVE — SIGNIFICANT CHANGE UP

## 2022-02-04 PROCEDURE — 76000 FLUOROSCOPY <1 HR PHYS/QHP: CPT

## 2022-02-04 PROCEDURE — 86901 BLOOD TYPING SEROLOGIC RH(D): CPT

## 2022-02-04 PROCEDURE — 86850 RBC ANTIBODY SCREEN: CPT

## 2022-02-04 PROCEDURE — 88304 TISSUE EXAM BY PATHOLOGIST: CPT

## 2022-02-04 PROCEDURE — 47563 LAPARO CHOLECYSTECTOMY/GRAPH: CPT | Mod: AS

## 2022-02-04 PROCEDURE — 88304 TISSUE EXAM BY PATHOLOGIST: CPT | Mod: 26

## 2022-02-04 PROCEDURE — 36415 COLL VENOUS BLD VENIPUNCTURE: CPT

## 2022-02-04 PROCEDURE — 47563 LAPARO CHOLECYSTECTOMY/GRAPH: CPT

## 2022-02-04 PROCEDURE — 86900 BLOOD TYPING SEROLOGIC ABO: CPT

## 2022-02-04 PROCEDURE — 81025 URINE PREGNANCY TEST: CPT

## 2022-02-04 PROCEDURE — C9399: CPT

## 2022-02-04 DEVICE — CLIP APPLIER COVIDIEN ENDOCLIP II 10MM MED/LG: Type: IMPLANTABLE DEVICE | Status: FUNCTIONAL

## 2022-02-04 DEVICE — CHOLGM ST KARLAN CATH 4FR 60CM: Type: IMPLANTABLE DEVICE | Status: FUNCTIONAL

## 2022-02-04 RX ORDER — OMEPRAZOLE 10 MG/1
1 CAPSULE, DELAYED RELEASE ORAL
Qty: 0 | Refills: 0 | DISCHARGE

## 2022-02-04 RX ORDER — SODIUM CHLORIDE 9 MG/ML
1000 INJECTION, SOLUTION INTRAVENOUS
Refills: 0 | Status: DISCONTINUED | OUTPATIENT
Start: 2022-02-04 | End: 2022-02-04

## 2022-02-04 RX ORDER — IBUPROFEN 200 MG
1 TABLET ORAL
Qty: 0 | Refills: 0 | DISCHARGE

## 2022-02-04 RX ORDER — OXYCODONE HYDROCHLORIDE 5 MG/1
1 TABLET ORAL
Qty: 6 | Refills: 0
Start: 2022-02-04

## 2022-02-04 RX ORDER — FENTANYL CITRATE 50 UG/ML
25 INJECTION INTRAVENOUS
Refills: 0 | Status: DISCONTINUED | OUTPATIENT
Start: 2022-02-04 | End: 2022-02-04

## 2022-02-04 RX ORDER — SODIUM CHLORIDE 9 MG/ML
3 INJECTION INTRAMUSCULAR; INTRAVENOUS; SUBCUTANEOUS EVERY 8 HOURS
Refills: 0 | Status: DISCONTINUED | OUTPATIENT
Start: 2022-02-04 | End: 2022-02-04

## 2022-02-04 RX ORDER — FENTANYL CITRATE 50 UG/ML
50 INJECTION INTRAVENOUS
Refills: 0 | Status: DISCONTINUED | OUTPATIENT
Start: 2022-02-04 | End: 2022-02-04

## 2022-02-04 NOTE — ASU PATIENT PROFILE, ADULT - FALL HARM RISK - UNIVERSAL INTERVENTIONS
Bed in lowest position, wheels locked, appropriate side rails in place/Call bell, personal items and telephone in reach/Instruct patient to call for assistance before getting out of bed or chair/Non-slip footwear when patient is out of bed/[place] to call system/Physically safe environment - no spills, clutter or unnecessary equipment/Purposeful Proactive Rounding/Room/bathroom lighting operational, light cord in reach

## 2022-02-04 NOTE — ASU PREOP CHECKLIST - BOWEL PREP
Message left for pt regarding below. Advised to call back with any questions or concerns.   
Per Mirna from sleep lab:   \"When home sleep study unit was returned, there was not date on in.  Pt did not return unit till 2 weeks after it was due back.  Due to this patient's were cancelled.  Did credit charge for home study.  Please advise if we should repeat  Home study.\"     Dr. Junior- Do you want to repeat home study?   
Since the patient had an indeterminate probability of sleep apnea, I would like to start with a nocturnal oximetry study on room air.  If this is abnormal, we will pursue a diagnostic testing.  
n/a

## 2022-02-04 NOTE — BRIEF OPERATIVE NOTE - NSICDXBRIEFPROCEDURE_GEN_ALL_CORE_FT
PROCEDURES:  Cholecystectomy, laparoscopic, with cholangiogram 04-Feb-2022 12:53:23  Mayra Hammond

## 2022-02-04 NOTE — ASU DISCHARGE PLAN (ADULT/PEDIATRIC) - CARE PROVIDER_API CALL
Neo Heredia)  Surgery  [address] [facility], [place] [address]  Phone: [phone]  Fax: [phone]  Follow Up Time: 2 weeks

## 2022-02-07 PROBLEM — K80.20 CALCULUS OF GALLBLADDER WITHOUT CHOLECYSTITIS WITHOUT OBSTRUCTION: Chronic | Status: ACTIVE | Noted: 2022-01-31

## 2022-02-14 LAB — SURGICAL PATHOLOGY STUDY: SIGNIFICANT CHANGE UP

## 2022-02-15 PROBLEM — K81.9 CHOLECYSTITIS: Status: ACTIVE | Noted: 2022-01-13

## 2022-02-17 ENCOUNTER — APPOINTMENT (OUTPATIENT)
Dept: SURGERY | Facility: CLINIC | Age: 27
End: 2022-02-17
Payer: MEDICAID

## 2022-02-17 VITALS — TEMPERATURE: 97.3 F

## 2022-02-17 DIAGNOSIS — K81.9 CHOLECYSTITIS, UNSPECIFIED: ICD-10-CM

## 2022-02-17 PROCEDURE — 99024 POSTOP FOLLOW-UP VISIT: CPT

## 2022-02-17 NOTE — PHYSICAL EXAM
[Calm] : calm [de-identified] : She  is alert, well-groomed, and in NAD\par   [de-identified] : Surgical wounds are  healing well.   no signs of  inflammation or infection.

## 2022-02-17 NOTE — HISTORY OF PRESENT ILLNESS
[de-identified] : Ms. SMITH  is s/p laparoscopic  cholecystectomy  on 02/04/2022. Today Ms. SMITH offers no complaints. patient reports no fever, chills,  or  pain. Her  surgical wounds are  healing well. No signs of inflammation, infection or exudate. Patient reports good bowel movements and appetite.

## 2022-02-17 NOTE — PLAN
[FreeTextEntry1] : Ms. SMITH will follow up  if needed. Warning signs, follow up, and restrictions were discussed with the patient.

## 2022-02-17 NOTE — ASSESSMENT
[FreeTextEntry1] : Ms. SMITH is doing well, with excellent post-operative recovery. All surgical incisions are healing well and as expected. There is no evidence of infection or complication, and she is progressing as expected. Post-operative wound care, activity, restrictions and precautions reinforced. Patient instructed to refrain from any heavy lifting greater than 10-15 pounds for at least 4 weeks post-operatively. pathology results were discussed in details.  Patient's questions and concerns addressed to patient's satisfaction.

## 2022-04-08 LAB — SARS-COV-2 N GENE NPH QL NAA+PROBE: NOT DETECTED

## 2024-06-05 NOTE — H&P PST ADULT - TOBACCO USE
You make calcium oxalate and calcium phosphate stone    Renal Ultrasound and KUB xray in 1 year    KIDNEY STONE INFORMATION  Below is some written information regarding your visit today.      Stone prevention strategies were discussed.   Increased water intake on a daily basis is the cornerstone of limiting stone production.   Adopting a low sodium diet helps to reduce the components in the urine responsible for some stone growth.  Stone inhibitors such as citrate and magnesium can be effective in reducing stone formation and growth. Lemonade is a good source of citrate.     Stone prevention strategies :   Hydrate more than 2 liters per day with water .   Avoid cranberry juice, grapefruit juice, orange juice   Low sodium diet   Limit dietary animal protein   Lemon juice One tbs daily in water and on vegetables - not straight - it could ruin your teeth   Limit Red meat    Please do not hesitate to contact me if you have any questions.  Thank you,    Aakash Mensah MD    [facility]  Office phone number: [phone]    KIDNEY STONE, Undescended, no symptoms    A kidney stone begins as tiny crystals that form inside the kidney where urine is made. Most kidney stones enlarge to about 1/8\" to 1/4\" in size before leaving the kidney and moving toward the bladder. While the stone remains in the kidney, it causes no symptoms. When the stone breaks free and begins to move down the ureter (the narrow tube joining the kidney to the bladder) it causes sharp back and side pain, often with nausea and vomiting. When the stone reaches the bladder, the pain stops. Once in your bladder, the kidney stone may pass through the urethra (urinary opening) while you are urinating. Or, it may break into such small fragments that you don’t notice it passing.  Your kidney stone is still inside the kidney. There is no way to predict how long it will be before it breaks free and causes any symptoms. Most stones will  pass on their own within a few hours to a few days (sometimes longer). You may notice a red, pink, or brown color to your urine. This is normal while passing a kidney stone. A large stone may not pass on its own and may require special procedures to remove it.    HOME CARE:  Drink plenty of fluids. This increases urine flow and reduces the risk of further stone formation. Healthy adults (no heart/liver/kidney disease) who have had a kidney stone should drink 2-3 quarts (8-12 eight-ounce glasses) of fluids per day. Most of this should be water. The goal is to produce 1.5 to 2 quarts of almost colorless urine per 24 hours.  If you develop pain, you may take ibuprofen (Motrin, Advil) or naproxen (Aleve) for pain, unless another medicine was prescribed. [NOTE: If you have chronic liver or kidney disease or ever had a stomach ulcer or GI bleeding, talk with your doctor before using these medicines.]    PREVENTION: Each year, there is a 5-10% chance that a new stone will form (50% chance over the next 10 years). The risk is highest if you have a family history of kidney stones or have certain chronic illnesses such as diabetes. However, there are lifestyle and dietary changes that you can make to reduce the risk of a recurrence.  Most kidney stones are made of calcium. The following is advice for preventing a recurrence of calcium stones.  If you don’t know the type of stone you have, follow this advice until the cause of your stone is determined.    Things that help:  The most important thing you can do is to drink plenty of fluids each day, as described above (#1).   Certain foods, such as wheat, rice, rye, barley and beans, contain phytate, a compound that may lower the risk of recurrence of any type of stone.  Increase the amounts of fruits and vegetables (especially those high in potassium).        Things to limit or avoid:  Calcium supplements may increase your risk of calcium stones. If you are taking these, talk  to your doctor about the risks and benefits of continuing this treatment. [Note: There is no need to limit the amount of calcium in the foods you eat (such as milk and dairy products)].  Limit salt intake to 2-3 grams (1 to 1.5 teaspoons) per day. Use limited amounts when cooking, and don’t add salt at the table.   Limit spinach, rhubarb, peanuts, cashews and almonds, grapefruit and grapefruit juice.  Reducing the amount of animal meat in your diet may lower your risk.   avoid excess sugar (sucrose) and fructose (sweetener in many soft drinks) in your diet.   If you take vitamin C as a supplement, do not take more than 1,000 mg per day.    FOLLOW UP with your doctor as advised by our staff. Talk to your doctor about urine and blood tests to find out the cause of your stone.    GET PROMPT MEDICAL ATTENTION if any of the following occur:  Severe sharp back or side pain  Repeated vomiting or unable to keep down fluids  Weakness, dizziness, or fainting  Fever of 100.4ºF (38ºC) or higher, or as directed by your healthcare provider  Blood (pink or red color) in your urine  Unable to pass urine for 8 hours or increasing bladder pressure    © 6000-9653 [facility], [address] [place] [address], [place] [address]. All rights reserved. This information is not intended as a substitute for professional medical care. Always follow your healthcare professional's instructions.                                  Preventing Kidney Stones  If you’ve had a kidney stone, you may worry that you’ll have another. Removing or passing your stone doesn’t prevent future stones. With your doctor’s help, though, you can reduce your risk of forming new stones. Follow up with your doctor to help detect new stones. You may need follow-up every 3 months to a year for a lifetime.      Drink Lots of Water  Staying well-hydrated is the best way to reduce your risk of future stones. Drink 8 twelve-ounce glasses of water daily. Have two with each meal and  two between meals. Try keeping a pitcher of water nearby during the day and at night.    Take Medications If Needed  Medications, including vitamins and minerals, may be prescribed for certain types of stones. You may want to write your doses and medication times on a calendar. Some medications decrease stone-forming chemicals in your blood. Others help prevent those chemicals from crystallizing in urine. Still others help keep a normal acid balance in your urine.    Follow Your Prescribed Diet  Your doctor will tell you which foods contain the chemicals you should avoid. Your doctor may also suggest talking to a dietitian. He or she can help you plan meals you’ll enjoy. These meals won’t put you at risk for future stones. You may be told to limit certain foods, depending upon which type of stones you’ve had.  For calcium oxalate stones:  Limit high-oxalate foods (such as cola, tea, chocolate, and peanuts).  Normal calcium intake (1200 mg per day from all sources is acceptable)  For uric acid stones: Limit high-purine foods, such as anchovies, poultry, and organ meats. These foods increase uric acid production.  For cystine stones: Limit high-methionine foods (fish is the most common). These foods increase production of cystine.    © 2143-3870 [facility], [address] [place] [address], [place] [id number]. All rights reserved. This information is not intended as a substitute for professional medical care. Always follow your healthcare professional's instructions.    Understanding Kidney Stones  Your kidneys are the chemical filters for your body. These bean-shaped organs constantly screen your blood, removing wastes and excess fluids. Healthy kidneys maintain the chemical balance your body needs.      What Are Kidney Stones?  Kidney stones are made up of chemical crystals that separate out from urine. These crystals clump together to make “stones.” They form in the calyx of the kidney. They may stay in the kidney or  move into the urinary tract.     Why Kidney Stones Form  Kidneys form stones for many reasons. If you don’t drink enough fluid, for instance, you won’t have enough urine to dilute chemicals. Then the chemicals may form crystals, which can develop into stones.  Fluid loss (dehydration) can concentrate urine, causing stones to form.  Certain foods contain large amounts of the chemicals that sometimes crystallize into stones.    Why Kidney Stones Form (Continued)  Kidney infections foster stones by slowing urine flow or changing the acid balance of your urine.  Family history: If relatives have had kidney stones, you’re more likely to have them, too.    Where Stones Form  Stones begin in the cup-shaped part of the kidney (calyx). Some stay in the calyx and grow. Others move into the kidney pelvis or into the ureter. There they can lodge, block the flow of urine, and cause pain.    Symptoms  Many stones cause sudden and severe pain and bloody urine. Others cause nausea or frequent, burning urination. Symptoms often depend on your stone’s size and location. Fever may indicate a serious infection. Call your doctor right away if you develop a fever.    © 2426-5669 [employer], [address] [place] [id number]. All rights reserved. This information is not intended as a substitute for professional medical care. Always follow your healthcare professional's instructions.    KIDNEY STONE PREVENTION  Urinary tract stones form, when urine becomes to concentrated with certain chemicals and substances that can turn into solids.    To make this clearer think of salty water, which is full of sodium chloride the chemical that makes up, salt.  When enough water is present, the salt remains dissolved in the water.  When the water evaporates, the residual salt solidifies at the bottom, leaving the salt as a solid.    In the urinary tract, sodium chloride, is not a problem, but other chemicals are.  The most common chemical  elements, in stones are calcium, oxalate, phosphates, and uric acid.  Less common are magnesium, ammonia, and cystine.  Once a small stone is started, the urine tends to coat the stone with more of the same chemical, or sometimes other chemicals join in to make a complex stone.  This is similar to a piece of sand in oyster.  The sand is an irritant and the oyster coats the sand particle - and a chris is formed!    CALCIUM STONES  By far the most common stones are made of a combination of calcium and oxalate.  When, calcium and oxalate join (or precipitate), they bind so tightly that most acids are not able to dissolve them. Many foods, particularly dairy products, have calcium.  Other foods, such as spinach and green, leafy vegetables are rich in oxalate. The body also manufactures its own oxalate, and the body stores immense amounts of calcium in the bones.      CALCIUM STONES (Continued)  Even if your diet has no calcium or oxalate, the urine will have some of these chemicals from the body stores.  When too much calcium or oxalate join in the urine, a calcium oxalate stone can form.  Other chemicals can join with calcium such as phosphate carbonate to also form stones.    In the past, advice to patients, who have suffered from calcium-containing kidney stones was to dramatic decrease the calcium in the diet, particularly milk and dairy products.  We are not so sure about that thinking today.  A recently published medical study showed that those patients, who took the highest amounts of calcium with ther food are less likely to develop a stone over the next 4 years than those who consume the smallest amount of the mineral!  Just the opposite of what we have been telling people.  The researcher's speculate that calcium in the diet help prevent the formation of kidney stone because of decreased the absorption of oxalate.  Unattached (or free) oxalate, which is found in foods such as chocolate, spinach, mercedes, tea,  and peanuts are normally absorbed into the bloodstream from the gut.  Therefore, if the oxalate can be bound to the calcium in the gut, it will not be absorbed.  By maintaining an adequate amount of calcium in your diet, you might decrease the oxalate absorption from the gastrointestinal tract and thus, decreased urinary excretion of oxalate.    There are also medications that can prevent calcium absorption from the gastrointestinal tract.  Diuretics such as hydrochlorothiazide, promote absorption of calcium from the kidneys and decrease the concentration of urine.    URIC ACID STONES  After calcium-containing stones, uric acid is the most common cause of stones comprising about 10% of the total number seen in the [place].  Uric acid stones are very different from calcium stones.  Uric acid is created within the body, it is not absorbed from the gut like calcium or oxalate.  Uric acid comes from the breakdown of certain food products, particularly red meat.  Uric acid does not need to join with another substance, it will form stones by itself.  Uric acid forms in acid.  The more acid the urine, the more precipitation of uric acid.  Stones made of uric acid can be treated by 2 different methods, but often combined together, reducing, urine acidity, and lowering the production of uric acid in the body.  Lowering the acidity of the urine is accomplished by giving medications or chemicals which counteract acid (antI-acids) such as sodium bicarbonate or potassium citrate.  A drug called allopurinol or Zyloprim decreases the production of uric acid in the body by blocking, certain enzymes.  Some patients with calcium oxalate stones will be found to have too much uric acid in the urine.  We feel that many calcium stones form on smaller uric acid centers (similar to a piece of sand causing a chris in and oyster).  By lowering uric acid levels in the urine, these patients form fewer stones.      URIC ACID STONES  (Continued)  Uric acid is also responsible for gout and gouty arthritis, when too much uric acid is found in the joints.  Not all patient's with gout get uric acid stones and vice versa.    WATER INTAKE  Whatever the cause of stones, drinking plenty of water prevents stone formation.  This occurs for 2 reasons.  First, water dilutes the urine making all chemicals less concentrated or less apt to precipitate.  Second, the increased flow may encourage the small stones to pass spontaneously before they reach a size that will not passed easily.  An adult should drink at least 8 glasses of water a day, particularly before bedtime when we tend to drink less in general.  This means that one might have to get up once or twice a night to urinate and drink another glass of water.  However, if stones are prevented, the time spent is worthwhile.  One should produce about 2 quarts of urine a day to prevent stones forming.  In those who exercise or do heavy physical labor, even more water intake is necessary.    If you have any questions regarding kidney stones, or what dietary modification, you should make, ask your physician.    LOW OXALATE DIET    These foods contain little or no oxalate.  They may be eaten as desired.  FRUITS  Apples, apricots, avocados, bananas, cherries, coconut, grapefruit, grapes (seedless), slava (except rind), mangos, melons, nectarines, peaches, pears, pineapple, plums (Green Adrien, Steiner Adrien)    VEGETABLES  Broccoli, brussel sprouts, cabbage, cauliflower, chives, lettuce, lima beans, mushrooms, onions, peas, potatoes (white), radishes, turnip, watercress    BEVERAGES  Apple juice, apricot juice, cider, grapefruit juice, lemonade, limeade, orange juice, pineapple juice, carbonated beverages, milk, beer, wine, jn, kathie    MISCELLANEOUS  Butter, margarine, cheese, milk, egg noodles, macaroni, rice, spaghetti, white bread, cornflakes, oatmeal, jam, jelly, plain candies          These foods contain a  minimum amount of oxalate (might per serving).   Do not eat them in large amounts.  FRUITS  Currants, black (3/4 cup), Orange (1 small), Prunes, Italian (4)    VEGETABLES  Asparagus (2/3 cup), corn (1/2 cup), endive (3 large or 6 small leaves), mustard greens (1/2 cup), tomato (1 small)    BEVERAGES  Coffee (6 ounces), cranberry juice (4 ounces), grape juice (4 ounces), lose tea (4 ounces), tomato juice (4 ounces)    MISCELLANEOUS  Black pepper (2 tsp), cocoa powder (1 Its), fruitcake (1 slice), soy crackers (2)  These foods contain a moderate amount of oxalate.  They may be eaten occasionally.  FRUITS  Blackberries (1/2 cup), blueberries (3/4 cup), dewberries (3/4 cup), currants, red/white (1/2 cup), plums, Damson (2), raspberries, red (1/2 cup), strawberries (2/3 cup), whortleberries (1/2 cup)    VEGETABLES  Beans, green/wax (1/2 cup), carrots (1/2 cup), celery (3/4 cup), eggplant (1/3 cup), green pepper (1 lg. shell), kale (3/4 cup cooked), parsley (1 Tbsp), rutabagas (1/2 cup), turnip greens (1/2 cup)    BEVERAGES  Ovaltine (1 Tbsp dry powder)    MISCELLANEOUS  Almonds (2 Tbsp), cashews (2 Tbsp), milk chocolate (1 ounce), pecans (2 Tbsp), walnuts (6 halves)  These foods contain a high amount of oxalate (20-49 mg per serving).  Do NOT eat them.  FRUITS  Grapes, [place]    VEGETABLES  Baked beans in tomato sauce, dandelion greens, summer squash    MISCELLANEOUS  Chocolate (dark, plain, or semi-sweet), grits (white corn), peanut butter, wheatgerm      These foods contain over 50mg of oxalate per serving.  These foods should NEVER be eaten.  FRUITS  Gooseberries, lemon peel, lime peel, orange peel, raspberries (black), rhubarb    VEGETABLES  Beet greens (tops), beets, collards, leeks, okra, pokeweed, sorrel, spinach, sweet potato, Swiss chard    BEVERAGES  Tea    MISCELLANEOUS  Gelatin, soy beans, tofu    LEMONADE and KIDNEY STONES    Diet plays a strong role in the recurring formation of kidney stones.  Not  drinking enough fluids leads to low urine output and over concentration of stone forming elements in the urine.  Naturally occurring compounds, such as citrate, inhibit, stones from forming.      Urologist frequently recommend drinking more than 2 quarts (64 ounces) of fluids daily.  In patients with low urinary citrate levels, citrate supplements are recommended.  However, some people find them expensive and distasteful.    Drinking lemonade is a tasty way to increase fluid and citrate intake.  Lemon juice was found to have 5 times the citrate levels of orange or grapefruit juice.  2 quarts of a daily lemonade mixture has about 6 grams of citric acid which is equivalent to taking potassium citrate medication.  It is also significantly cheaper.    In addition to lemonade, we also recommend increased fluids, balanced meals, reduce protein and salt intake to help reduce the chance of stone recurrence.      * Lemonade Recipe *     1/2 cup concentrated lemon juice     7-1/2 cups of water     Sugar or sugar substitute to taste                (makes 2 quarts)        Former smoker

## 2025-03-17 NOTE — ASU PATIENT PROFILE, ADULT - AS SC BRADEN MOBILITY
Called patient,  answered the phone.  He stated she is outside but knew she was expecting a call to get a copy of her xray.  Permission in chart to discuss with .  I provided him with medical records number to obtain a copy. He wrote it down and repeated it back to me. He verbalized understanding.   (4) no limitation

## 2025-07-09 NOTE — H&P PST ADULT - BACK
No contact sports for 6-8 weeks.  Follow up with PCP to trend liver enzymes.   Take tylenol/motrin for pain.  You may take tylenol 650mg every 6 hours as needed for pain. Do not exceed 3500mg in 24 hours.  You may take 400-600mg ibuprofen every 6 hours as needed.    If needed, you can alternate these medications so that you take one medication every 3 hours.   For instance, at noon take ibuprofen, then at 3pm take tylenol, then at 6pm take ibuprofen again.    Take medications as prescribed  Return to the ED with new/worsening symptoms.           
No deformity or limitation of movement

## (undated) DEVICE — TUBING TRUWAVE PRESSURE MALE/FEMALE 72"

## (undated) DEVICE — BLANKET WARMER UPPER ADULT

## (undated) DEVICE — DRSG MEDIPORE DRESS IT 5-7/8 X 11"

## (undated) DEVICE — DRSG 4X4

## (undated) DEVICE — FOR-ESU VALLEYLAB T7E14848DX: Type: DURABLE MEDICAL EQUIPMENT

## (undated) DEVICE — DRAIN JACKSON PRATT 10MM FLAT 3/4 NO TROCAR

## (undated) DEVICE — DRAIN RESERVOIR FOR JACKSON PRATT 100CC CARDINAL

## (undated) DEVICE — DRSG BANDAID 0.75X3"

## (undated) DEVICE — STAPLER SKIN VISI-STAT 35 WIDE

## (undated) DEVICE — SUT POLYSORB 4-0 27" P-12 UNDYED

## (undated) DEVICE — D HELP - CLEARVIEW CLEARIFY SYSTEM

## (undated) DEVICE — WRAP COMPRESSION CALF MED

## (undated) DEVICE — GLV 7.5 PROTEXIS

## (undated) DEVICE — DRAPE C ARM UNIVERSAL

## (undated) DEVICE — PACK GENERAL LAPAROSCOPY

## (undated) DEVICE — SOL INJ NS 0.9% 1000ML

## (undated) DEVICE — TUBING STRYKEFLOW II SUCTION / IRRIGATOR

## (undated) DEVICE — NDL INSUFFLATION SURGINEEDLE 120MM

## (undated) DEVICE — BLADE SURGICAL #10 CARBON

## (undated) DEVICE — DRSG STERISTRIPS 0.5X4"

## (undated) DEVICE — ENDOCATCH 10MM SPECIMEN POUCH

## (undated) DEVICE — TROCAR COVIDIEN VERSAONE BLADED SMOOTH 5MM

## (undated) DEVICE — SOL IRR POUR NS 0.9% 1500ML

## (undated) DEVICE — SET BASIN SINGLE STERILE

## (undated) DEVICE — GLV 7 PROTEXIS

## (undated) DEVICE — SUT MAXON 1 60" T-60

## (undated) DEVICE — PRECISION CUT SCISSOR MICROLINE MINI ENDOCUT DISP

## (undated) DEVICE — SYR ASEPTO

## (undated) DEVICE — ELCTR BOVIE BLADE 3/4" EXTENDED LENGTH 6"

## (undated) DEVICE — DRAPE LIGHT HANDLE COVER BLUE

## (undated) DEVICE — TUBING STRYKER PNEUMOSURE HEATED RTP

## (undated) DEVICE — SUCTION YANKAUER NO CONTROL VENT

## (undated) DEVICE — SUT POLYSORB 0 30" GU-46

## (undated) DEVICE — SYR LUER LOK 10CC

## (undated) DEVICE — TROCAR COVIDIEN VERSAONE BLADED 11MM STD

## (undated) DEVICE — BLADE SURGICAL #15 CARBON

## (undated) DEVICE — NDL HYPO REGULAR BEVEL 25G X 1.5"

## (undated) DEVICE — DRSG MASTISOL

## (undated) DEVICE — ELCTR FOOT CONTROL L WIRE LAPAROSCOPIC

## (undated) DEVICE — ELCTR GROUNDING PAD ADULT COVIDIEN